# Patient Record
Sex: FEMALE | Race: WHITE
[De-identification: names, ages, dates, MRNs, and addresses within clinical notes are randomized per-mention and may not be internally consistent; named-entity substitution may affect disease eponyms.]

---

## 2018-10-30 ENCOUNTER — HOSPITAL ENCOUNTER (OUTPATIENT)
Dept: HOSPITAL 61 - PF | Age: 63
Discharge: HOME | End: 2018-10-30
Attending: INTERNAL MEDICINE
Payer: COMMERCIAL

## 2018-10-30 DIAGNOSIS — M25.78: ICD-10-CM

## 2018-10-30 DIAGNOSIS — M51.36: Primary | ICD-10-CM

## 2018-10-30 DIAGNOSIS — F17.210: ICD-10-CM

## 2018-10-30 DIAGNOSIS — M12.88: ICD-10-CM

## 2018-10-30 DIAGNOSIS — J44.9: ICD-10-CM

## 2018-10-30 PROCEDURE — 72100 X-RAY EXAM L-S SPINE 2/3 VWS: CPT

## 2018-10-30 PROCEDURE — 94010 BREATHING CAPACITY TEST: CPT

## 2018-10-30 NOTE — RAD
Indication: lumbar pain.

 

TECHNIQUE: 3 views of the lumbar spine

 

COMPARISON: None

 

FINDINGS:

There are 5 lumbar type vertebral bodies. No compression deformities. 

Lumbar spine is in normal anatomic alignment. Mild multilevel endplate 

sclerosis noted with small osteophyte formation. Mild lower lumbar spine 

facet arthropathy. SI joints within normal limits.

 

IMPRESSION:

1. Mild multilevel degenerative disc disease with associated mild facet 

arthropathy. No compression deformities.

 

Electronically signed by: Tereso Velásquez DO (10/30/2018 4:31 PM) [facility]